# Patient Record
Sex: MALE | Race: WHITE
[De-identification: names, ages, dates, MRNs, and addresses within clinical notes are randomized per-mention and may not be internally consistent; named-entity substitution may affect disease eponyms.]

---

## 2018-06-03 ENCOUNTER — HOSPITAL ENCOUNTER (EMERGENCY)
Dept: HOSPITAL 17 - PHED | Age: 63
Discharge: HOME | End: 2018-06-03
Payer: COMMERCIAL

## 2018-06-03 VITALS — HEIGHT: 73 IN | WEIGHT: 246.92 LBS | BODY MASS INDEX: 32.72 KG/M2

## 2018-06-03 VITALS
RESPIRATION RATE: 18 BRPM | HEART RATE: 73 BPM | OXYGEN SATURATION: 95 % | SYSTOLIC BLOOD PRESSURE: 108 MMHG | DIASTOLIC BLOOD PRESSURE: 64 MMHG

## 2018-06-03 VITALS
OXYGEN SATURATION: 95 % | SYSTOLIC BLOOD PRESSURE: 142 MMHG | DIASTOLIC BLOOD PRESSURE: 75 MMHG | HEART RATE: 90 BPM | TEMPERATURE: 97.6 F | RESPIRATION RATE: 16 BRPM

## 2018-06-03 VITALS — SYSTOLIC BLOOD PRESSURE: 128 MMHG | DIASTOLIC BLOOD PRESSURE: 81 MMHG

## 2018-06-03 DIAGNOSIS — Z88.0: ICD-10-CM

## 2018-06-03 DIAGNOSIS — K57.92: Primary | ICD-10-CM

## 2018-06-03 DIAGNOSIS — Z79.82: ICD-10-CM

## 2018-06-03 DIAGNOSIS — Z79.899: ICD-10-CM

## 2018-06-03 DIAGNOSIS — N40.0: ICD-10-CM

## 2018-06-03 DIAGNOSIS — I10: ICD-10-CM

## 2018-06-03 DIAGNOSIS — K76.0: ICD-10-CM

## 2018-06-03 LAB
ALBUMIN SERPL-MCNC: 3.3 GM/DL (ref 3.4–5)
ALP SERPL-CCNC: 39 U/L (ref 45–117)
ALT SERPL-CCNC: 25 U/L (ref 12–78)
AST SERPL-CCNC: 11 U/L (ref 15–37)
BASOPHILS # BLD AUTO: 0.1 TH/MM3 (ref 0–0.2)
BASOPHILS NFR BLD: 0.7 % (ref 0–2)
BILIRUB SERPL-MCNC: 0.7 MG/DL (ref 0.2–1)
BUN SERPL-MCNC: 18 MG/DL (ref 7–18)
CALCIUM SERPL-MCNC: 8.6 MG/DL (ref 8.5–10.1)
CHLORIDE SERPL-SCNC: 109 MEQ/L (ref 98–107)
COLOR UR: YELLOW
CREAT SERPL-MCNC: 1 MG/DL (ref 0.6–1.3)
EOSINOPHIL # BLD: 0.3 TH/MM3 (ref 0–0.4)
EOSINOPHIL NFR BLD: 3 % (ref 0–4)
ERYTHROCYTE [DISTWIDTH] IN BLOOD BY AUTOMATED COUNT: 13.3 % (ref 11.6–17.2)
GFR SERPLBLD BASED ON 1.73 SQ M-ARVRAT: 76 ML/MIN (ref 89–?)
GLUCOSE SERPL-MCNC: 123 MG/DL (ref 74–106)
GLUCOSE UR STRIP-MCNC: (no result) MG/DL
HCO3 BLD-SCNC: 26.9 MEQ/L (ref 21–32)
HCT VFR BLD CALC: 46.6 % (ref 39–51)
HGB BLD-MCNC: 15 GM/DL (ref 13–17)
HGB UR QL STRIP: (no result)
KETONES UR STRIP-MCNC: (no result) MG/DL
LYMPHOCYTES # BLD AUTO: 1.6 TH/MM3 (ref 1–4.8)
LYMPHOCYTES NFR BLD AUTO: 17.9 % (ref 9–44)
MCH RBC QN AUTO: 25.4 PG (ref 27–34)
MCHC RBC AUTO-ENTMCNC: 32.1 % (ref 32–36)
MCV RBC AUTO: 79.2 FL (ref 80–100)
MONOCYTE #: 0.8 TH/MM3 (ref 0–0.9)
MONOCYTES NFR BLD: 9 % (ref 0–8)
NEUTROPHILS # BLD AUTO: 5.9 TH/MM3 (ref 1.8–7.7)
NEUTROPHILS NFR BLD AUTO: 69.4 % (ref 16–70)
NITRITE UR QL STRIP: (no result)
PLATELET # BLD: 263 TH/MM3 (ref 150–450)
PMV BLD AUTO: 7.4 FL (ref 7–11)
PROT SERPL-MCNC: 6.9 GM/DL (ref 6.4–8.2)
RBC # BLD AUTO: 5.89 MIL/MM3 (ref 4.5–5.9)
SODIUM SERPL-SCNC: 141 MEQ/L (ref 136–145)
SP GR UR STRIP: 1.01 (ref 1–1.03)
SQUAMOUS #/AREA URNS HPF: (no result) /HPF (ref 0–5)
URINE LEUKOCYTE ESTERASE: (no result)
WBC # BLD AUTO: 8.7 TH/MM3 (ref 4–11)

## 2018-06-03 PROCEDURE — 99284 EMERGENCY DEPT VISIT MOD MDM: CPT

## 2018-06-03 PROCEDURE — 96374 THER/PROPH/DIAG INJ IV PUSH: CPT

## 2018-06-03 PROCEDURE — 81001 URINALYSIS AUTO W/SCOPE: CPT

## 2018-06-03 PROCEDURE — 74177 CT ABD & PELVIS W/CONTRAST: CPT

## 2018-06-03 PROCEDURE — 85025 COMPLETE CBC W/AUTO DIFF WBC: CPT

## 2018-06-03 PROCEDURE — 83690 ASSAY OF LIPASE: CPT

## 2018-06-03 PROCEDURE — 80053 COMPREHEN METABOLIC PANEL: CPT

## 2018-06-03 NOTE — RADRPT
EXAM DATE:  6/3/2018 2:07 PM EDT

AGE/SEX:        63 years / Male



INDICATIONS:  Left lower quadrant pain.



CLINICAL DATA:  This is the patient's initial encounter. Patient reports that signs and symptoms have
 been present for 1 day and indicates a pain score of 4/10. 

                                                                          

MEDICAL/SURGICAL HISTORY:       Hypertension. None.



ORAL CONTRAST:   No oral contrast ingested.



RADIATION DOSE:  20.27 CTDI (mGy)









COMPARISON:      No prior [facility] exams available for comparison.



TECHNIQUE:  Multiple contiguous axial images were obtained through the abdomen and pelvis following b
olus infusion of  95 ml Omnipaque 350 (iohexol)  nonionic water-soluble contrast as a single exam dos
e. No oral contrast ingested.  Using automated exposure control and adjustment of the mA and/or kV ac
cording to patient size, the radiation dose was kept as low as reasonably achievable to obtain optima
l diagnostic quality images.



FINDINGS: 

Mild diverticulitis seen at the level of the mid descending colon, for example series 2 image 53. No 
abscess, perforation or obstruction. The rest of the gastrointestinal tract is within normal limits. 
The appendix is well-visualized, normal.



Liver is mild fatty infiltrated. A 14 mm hypodensity involves the right hepatic lobe, series 2 image 
27, probably benign.



The spleen, pancreas, adrenal glands and kidneys are within normal limits.



Heterogeneous and enlarged prostate.



Trace atelectasis seen of the visualized lung bases. No acute bony abnormality demonstrated.



CONCLUSION:

1.  Mild uncomplicated diverticulitis of the descending colon.

2.  Fatty liver.

3.  Nonspecific 14 mm hypodensity of the right hepatic lobe, probably benign.

4.  Nonspecific heterogeneous enlargement of the prostate.



Electronically signed by: Tristen Wright MD  6/3/2018 2:14 PM EDT

## 2018-06-03 NOTE — PD
HPI


Chief Complaint:  Abdominal Pain


Time Seen by Provider:  12:59


Travel History


International Travel<30 days:  No


Contact w/Intl Traveler<30days:  No


Traveled to known affect area:  No





History of Present Illness


HPI


62yo M with c/o left lower abdominal pain since yesterday.  Said it is getting 

more severe, sharp, nonradiating.  Moving worsens the pain.  +Fever of 101F 

yesterday.  Denies any chest pain, sob, n/v, dysuria, hematuria.





PFSH


Past Medical History


Hypertension:  Yes


Influenza Vaccination:  Yes





Past Surgical History


Other Surgery:  Yes (BACK SURGERY)





Social History


Alcohol Use:  Yes (daily cocktail)


Tobacco Use:  No


Substance Use:  No





Allergies-Medications


(Allergen,Severity, Reaction):  


Coded Allergies:  


     penicillin G (Unverified  Allergy, Severe, 6/3/18)


Reported Meds & Prescriptions





Reported Meds & Active Scripts


Active


Reported


Metoprolol Tartrate 50 Mg Tab 50 Mg PO HS


Lisinopril 20 Mg Tab 20 Mg PO DAILY


Aspirin Low Dose (Aspirin) 81 Mg Chew 81 Mg CHEW DAILY








Review of Systems


Except as stated in HPI:  all other systems reviewed are Neg





Physical Exam


Narrative


GENERAL: 62yo M in mild distress.


SKIN: Focused skin assessment warm/dry.


HEAD: Atraumatic. Normocephalic. 


EYES: Pupils equal and round. No scleral icterus. No injection or drainage. 


ENT: No nasal bleeding or discharge.  Mucous membranes pink and moist.


NECK: Trachea midline. No JVD. 


CARDIOVASCULAR: Regular rate and rhythm.  No murmur appreciated.


RESPIRATORY: No accessory muscle use. Clear to auscultation. Breath sounds 

equal bilaterally. 


GASTROINTESTINAL: Abdomen soft, +TTP LLQ.  No rebound tenderness or guarding.


MUSCULOSKELETAL: No obvious deformities. No clubbing.  No cyanosis.  No edema. 


NEUROLOGICAL: Awake and alert. No obvious cranial nerve deficits.  Motor 

grossly within normal limits. Normal speech.


PSYCHIATRIC: Appropriate mood and affect; insight and judgment normal.





Data


Data


Last Documented VS





Vital Signs








  Date Time  Temp Pulse Resp B/P (MAP) Pulse Ox O2 Delivery O2 Flow Rate FiO2


 


6/3/18 14:25  73 18 108/64 (79) 95 Room Air  


 


6/3/18 12:03 97.6       








Orders





 Orders


Complete Blood Count With Diff (6/3/18 13:08)


Comprehensive Metabolic Panel (6/3/18 13:08)


Lipase (6/3/18 13:08)


Urinalysis - C+S If Indicated (6/3/18 13:08)


Ct Abd/Pel W Iv Contrast(Rout) (6/3/18 13:08)


Morphine Inj (Morphine Inj) (6/3/18 13:15)


Iohexol 350 Inj (Omnipaque 350 Inj) (6/3/18 14:06)


Ciprofloxacin (Cipro) (6/3/18 14:45)


Metronidazole (Flagyl) (6/3/18 14:45)





Labs





Laboratory Tests








Test


  6/3/18


13:16 6/3/18


13:30


 


White Blood Count 8.7 TH/MM3  


 


Red Blood Count 5.89 MIL/MM3  


 


Hemoglobin 15.0 GM/DL  


 


Hematocrit 46.6 %  


 


Mean Corpuscular Volume 79.2 FL  


 


Mean Corpuscular Hemoglobin 25.4 PG  


 


Mean Corpuscular Hemoglobin


Concent 32.1 % 


  


 


 


Red Cell Distribution Width 13.3 %  


 


Platelet Count 263 TH/MM3  


 


Mean Platelet Volume 7.4 FL  


 


Neutrophils (%) (Auto) 69.4 %  


 


Lymphocytes (%) (Auto) 17.9 %  


 


Monocytes (%) (Auto) 9.0 %  


 


Eosinophils (%) (Auto) 3.0 %  


 


Basophils (%) (Auto) 0.7 %  


 


Neutrophils # (Auto) 5.9 TH/MM3  


 


Lymphocytes # (Auto) 1.6 TH/MM3  


 


Monocytes # (Auto) 0.8 TH/MM3  


 


Eosinophils # (Auto) 0.3 TH/MM3  


 


Basophils # (Auto) 0.1 TH/MM3  


 


CBC Comment DIFF FINAL  


 


Differential Comment   


 


Blood Urea Nitrogen 18 MG/DL  


 


Creatinine 1.00 MG/DL  


 


Random Glucose 123 MG/DL  


 


Total Protein 6.9 GM/DL  


 


Albumin 3.3 GM/DL  


 


Calcium Level 8.6 MG/DL  


 


Alkaline Phosphatase 39 U/L  


 


Aspartate Amino Transf


(AST/SGOT) 11 U/L 


  


 


 


Alanine Aminotransferase


(ALT/SGPT) 25 U/L 


  


 


 


Total Bilirubin 0.7 MG/DL  


 


Sodium Level 141 MEQ/L  


 


Potassium Level 4.1 MEQ/L  


 


Chloride Level 109 MEQ/L  


 


Carbon Dioxide Level 26.9 MEQ/L  


 


Anion Gap 5 MEQ/L  


 


Estimat Glomerular Filtration


Rate 76 ML/MIN 


  


 


 


Lipase 167 U/L  


 


Urine Collection Type  CLEAN CATCH 


 


Urine Color  YELLOW 


 


Urine Turbidity  CLEAR 


 


Urine pH  6.0 


 


Urine Specific Gravity  1.015 


 


Urine Protein  NEG mg/dL 


 


Urine Glucose (UA)  NEG mg/dL 


 


Urine Ketones  NEG mg/dL 


 


Urine Occult Blood  NEG 


 


Urine Nitrite  NEG 


 


Urine Bilirubin  NEG 


 


Urine Urobilinogen  1.0 MG/DL 


 


Urine Leukocyte Esterase  NEG 


 


Urine Squamous Epithelial


Cells 


  0-5 /hpf 


 


 


Microscopic Urinalysis Comment


  


  CULT NOT


INDICATED


 


Urine Collection Time  1330 











MDM


Medical Decision Making


Medical Screen Exam Complete:  Yes


Emergency Medical Condition:  Yes


Differential Diagnosis


Diverticulitis vs. nephrolithiasis vs. UTI


Narrative Course


62yo M with left lower abdominal pain since yesterday.  Labs reviewed, no 

leukocytosis.  H/H normal.  Lipase normal.  CMP unremarkable.  UA negative.  CT 

a/p showed mild uncomplicated diverticulitis of the descending colon.  Fatty 

liver.  Nonspecific 14mm hypodensity of right hepatic lobe, probably benign.  

Nonspecific enlargement of prostate.  Pt is well appearing and tolerating PO.  

Given cipro and flagyl.  Will try outpatient therapy first.  Pt tolerating PO.  

Return precautions given.





Diagnosis





 Primary Impression:  


 Diverticulitis


Patient Instructions:  General Instructions


Departure Forms:  Tests/Procedures





***Additional Instructions:  


Please follow up with your primary care physician in 2-3 days.  Return 

precautions given.


***Med/Other Pt SpecificInfo:  Prescription(s) given


Scripts


Metronidazole (Metronidazole) 500 Mg Tab


500 MG PO TID for Infection for 10 Days, TAB 0 Refills


   Prov: Cristal Luis DO         6/3/18 


Ciprofloxacin (Ciprofloxacin) 500 Mg Tab


500 MG PO BID for Infection for 10 Days, #20 TAB 0 Refills


   Prov: Cristal Luis DO         6/3/18


Disposition:  01 DISCHARGE HOME


Condition:  Stable











Cristal Luis DO Papi 3, 2018 14:04